# Patient Record
Sex: FEMALE | Race: WHITE | NOT HISPANIC OR LATINO | URBAN - METROPOLITAN AREA
[De-identification: names, ages, dates, MRNs, and addresses within clinical notes are randomized per-mention and may not be internally consistent; named-entity substitution may affect disease eponyms.]

---

## 2021-09-24 ENCOUNTER — OFFICE VISIT (OUTPATIENT)
Dept: URGENT CARE | Facility: URGENT CARE | Age: 21
End: 2021-09-24
Payer: COMMERCIAL

## 2021-09-24 VITALS
DIASTOLIC BLOOD PRESSURE: 72 MMHG | SYSTOLIC BLOOD PRESSURE: 126 MMHG | HEART RATE: 68 BPM | WEIGHT: 135 LBS | OXYGEN SATURATION: 100 % | TEMPERATURE: 98.2 F

## 2021-09-24 DIAGNOSIS — R30.0 DYSURIA: Primary | ICD-10-CM

## 2021-09-24 DIAGNOSIS — N39.0 URINARY TRACT INFECTION WITHOUT HEMATURIA, SITE UNSPECIFIED: ICD-10-CM

## 2021-09-24 LAB
BACTERIA #/AREA URNS HPF: ABNORMAL /HPF
RBC #/AREA URNS AUTO: ABNORMAL /HPF
SQUAMOUS #/AREA URNS AUTO: ABNORMAL /LPF
WBC #/AREA URNS AUTO: ABNORMAL /HPF

## 2021-09-24 PROCEDURE — 87086 URINE CULTURE/COLONY COUNT: CPT | Performed by: FAMILY MEDICINE

## 2021-09-24 PROCEDURE — 81015 MICROSCOPIC EXAM OF URINE: CPT | Performed by: FAMILY MEDICINE

## 2021-09-24 PROCEDURE — 99203 OFFICE O/P NEW LOW 30 MIN: CPT | Performed by: FAMILY MEDICINE

## 2021-09-24 RX ORDER — SULFAMETHOXAZOLE/TRIMETHOPRIM 800-160 MG
1 TABLET ORAL 2 TIMES DAILY
Qty: 14 TABLET | Refills: 0 | Status: SHIPPED | OUTPATIENT
Start: 2021-09-24 | End: 2021-10-01

## 2021-09-24 RX ORDER — NITROFURANTOIN 25; 75 MG/1; MG/1
100 CAPSULE ORAL 2 TIMES DAILY
Qty: 14 CAPSULE | Refills: 0 | Status: CANCELLED | OUTPATIENT
Start: 2021-09-24 | End: 2021-10-01

## 2021-09-24 NOTE — PROGRESS NOTES
SUBJECTIVE:  New patient to [place]    Dinora Rendon is a 21 year old female who  presents today for a possible UTI. Symptoms of dysuria and frequency have been going on for 1day(s).  Treating UTI for the past couple of months, intermittent.    Was treated for UTI, treated while in [place] twice - took a powder which did not help, took another course of antibiotic - unsure of name    Currently taking macrobid since 9/17 - this was thru Telehealth so no urine sample obtained.  Is taking azol due to discomfort.    Menses is irregular, states that is not pregnant  Leaving to go back to [place] on Sunday    No past medical history on file.  No current outpatient medications on file.     Social History     Tobacco Use     Smoking status: Never Smoker     Smokeless tobacco: Never Used   Substance Use Topics     Alcohol use: Yes       ROS:   Review of systems negative except as stated above.    OBJECTIVE:  /72   Pulse 68   Temp 98.2  F (36.8  C) (Oral)   Wt 61.2 kg (135 lb)   LMP 09/16/2021   SpO2 100%   GENERAL APPEARANCE: healthy, alert and no distress  PSYCH: mentation appears normal and affect normal/bright    Results for orders placed or performed in visit on 09/24/21   Urine Microscopic     Status: Abnormal   Result Value Ref Range    Bacteria Urine Few (A) None Seen /HPF    RBC Urine 0-2 0-2 /HPF /HPF    WBC Urine 5-10 (A) 0-5 /HPF /HPF    Squamous Epithelials Urine Few (A) None Seen /LPF    Narrative    Urine Culture not indicated       ASSESSMENT/PLAN:   (R30.0) Dysuria  (primary encounter diagnosis)  Plan: UA Macro with Reflex to Micro and Culture - lab        collect, Urine Microscopic, Urine Culture,         sulfamethoxazole-trimethoprim (BACTRIM DS)         800-160 MG tablet            (N39.0) Urinary tract infection without hematuria, site unspecified  Plan: sulfamethoxazole-trimethoprim (BACTRIM DS)         800-160 MG tablet            Empiric treatment for UTI with RX Bactrim DS.  Will obtain  urine culture and adjust medication if needed.  Okay to continue with azol.  Encourage to drink plenty of fluids.  Prevention and treatment of UTI's discussed.Signs and symptoms of pyelonephritis mentioned.  Discussed recurrent UTI vs interstitial cystitis.    Recommend to follow up with primary provider when back in [place] for urine recheck.    Hernán Frias MD,  September 24, 2021 10:51 AM

## 2021-09-25 LAB — BACTERIA UR CULT: NO GROWTH

## 2021-10-17 ENCOUNTER — HEALTH MAINTENANCE LETTER (OUTPATIENT)
Age: 21
End: 2021-10-17

## 2021-10-18 ENCOUNTER — NURSE TRIAGE (OUTPATIENT)
Dept: NURSING | Facility: CLINIC | Age: 21
End: 2021-10-18

## 2021-10-19 NOTE — TELEPHONE ENCOUNTER
Pt seen in  on 09/24/2021, discussed recurrent UTI vs interstitial cystitis.  Pt is requesting a referral for PT for pelvic floor therapy.      Pt does not have a PCP and is hoping her  provider can provider her this referral until she can find a PCP.  Pt is currently in [place].      Pt can be reached at # on file.  Leave a detailed VM if no answer.             COVID 19 Nurse Triage Plan/Patient Instructions    Please be aware that novel coronavirus (COVID-19) may be circulating in the community. If you develop symptoms such as fever, cough, or SOB or if you have concerns about the presence of another infection including coronavirus (COVID-19), please contact your health care provider or visit [email or web address].     Disposition/Instructions    Home care recommended. Follow home care protocol based instructions.    Thank you for taking steps to prevent the spread of this virus.  o Limit your contact with others.  o Wear a simple mask to cover your cough.  o Wash your hands well and often.    Resources    [facility]: About COVID-19: [email or web address]    CDC: What to Do If You're Sick: www.cdc.gov/coronavirus/2019-ncov/about/steps-when-sick.html    CDC: Ending Home Isolation: www.cdc.gov/coronavirus/2019-ncov/hcp/disposition-in-home-patients.html     CDC: Caring for Someone: www.cdc.gov/coronavirus/2019-ncov/if-you-are-sick/care-for-someone.html     [facility]: Interim Guidance for Hospital Discharge to Home: www.health.[facility].mn.us/diseases/coronavirus/hcp/hospdischarge.pdf    [facility] clinical trials (COVID-19 research studies): clinicalaffairs.[facility]/n-clinical-trials     Below are the COVID-19 hotlines at the [facility] ([facility]). Interpreters are available.   o For health questions: Call 933-070-9960 or 1-975.748.2324 (7 a.m. to 7 p.m.)  o For questions about schools and childcare: Call 502-262-2507 or 1-294.429.7186 (7 a.m. to 7 p.m.)                Dacia Adler RN/JUWAN      Reason for Disposition    [1] Caller requesting NON-URGENT health information AND [2] PCP's office is the best resource    Additional Information    Negative: RN needs further essential information from caller in order to complete triage    Negative: Requesting regular office appointment    Protocols used: INFORMATION ONLY CALL - NO TRIAGE-A-AH

## 2021-10-20 NOTE — TELEPHONE ENCOUNTER
Please contact patient -    It is best to establish with a primary provider to help coordinate her care.  Physical therapy is best ordered by primary provider, not thru Urgent Care and especially if this is out of state.

## 2021-10-26 NOTE — TELEPHONE ENCOUNTER
RN called pt back, no answer, left a detailed VM with the information below from Dr. Frias and to call back with any further questions.     Dacia Adler RN/FNA

## 2022-10-03 ENCOUNTER — HEALTH MAINTENANCE LETTER (OUTPATIENT)
Age: 22
End: 2022-10-03

## 2023-02-11 ENCOUNTER — HEALTH MAINTENANCE LETTER (OUTPATIENT)
Age: 23
End: 2023-02-11

## 2024-03-09 ENCOUNTER — HEALTH MAINTENANCE LETTER (OUTPATIENT)
Age: 24
End: 2024-03-09